# Patient Record
Sex: FEMALE | Race: WHITE | NOT HISPANIC OR LATINO | URBAN - NONMETROPOLITAN AREA
[De-identification: names, ages, dates, MRNs, and addresses within clinical notes are randomized per-mention and may not be internally consistent; named-entity substitution may affect disease eponyms.]

---

## 2020-01-02 ENCOUNTER — IMPORTED ENCOUNTER (OUTPATIENT)
Dept: URBAN - NONMETROPOLITAN AREA CLINIC 1 | Facility: CLINIC | Age: 50
End: 2020-01-02

## 2020-01-02 PROBLEM — E11.9: Noted: 2020-01-02

## 2020-01-02 PROBLEM — H25.13: Noted: 2020-01-02

## 2020-01-02 PROBLEM — H52.4: Noted: 2020-01-02

## 2020-01-02 PROCEDURE — S0620 ROUTINE OPHTHALMOLOGICAL EXA: HCPCS

## 2020-01-02 NOTE — PATIENT DISCUSSION
Type II DM dx 2008-  Discussed findings w/ pt today-  Doing well currently under control with insulin and oral medications-  No diabetic retinopathy noted on exam today-  Stressed importance of good blood sugar control and how it can affect her vision-  Monitor yearly or PRN Trace Cataracts-  discussed findings w/ pt today-  not visually significant-  will continue to monitor for progressionPresbyopia-  Discussed refractive status w/ pt today-  MR done new GLRx given.  Recommended progressive lenses to use PRN-  Monitor PRN; Dr's Notes: MR  1/2/2020DFE  1/2/2020PCP:  Anant Sanders MD @  [facility] [place] [address] [facility] [phone] | [phone] Fax

## 2022-03-21 NOTE — PATIENT DISCUSSION
I and R today but patient wasn't able to insert or remove contact on his own yet. Will schedule a second I and R. CL not given today.

## 2022-04-10 ASSESSMENT — VISUAL ACUITY
OD_CC: 20/40-
OD_PH: 20/20-
OS_CC: 20/25-

## 2022-04-10 ASSESSMENT — TONOMETRY
OS_IOP_MMHG: 23
OD_IOP_MMHG: 18